# Patient Record
Sex: MALE | Race: WHITE
[De-identification: names, ages, dates, MRNs, and addresses within clinical notes are randomized per-mention and may not be internally consistent; named-entity substitution may affect disease eponyms.]

---

## 2019-11-03 ENCOUNTER — HOSPITAL ENCOUNTER (EMERGENCY)
Dept: HOSPITAL 62 - ER | Age: 39
Discharge: HOME | End: 2019-11-03
Payer: COMMERCIAL

## 2019-11-03 VITALS — SYSTOLIC BLOOD PRESSURE: 143 MMHG | DIASTOLIC BLOOD PRESSURE: 84 MMHG

## 2019-11-03 DIAGNOSIS — I10: ICD-10-CM

## 2019-11-03 DIAGNOSIS — Z88.0: ICD-10-CM

## 2019-11-03 DIAGNOSIS — Z88.6: ICD-10-CM

## 2019-11-03 DIAGNOSIS — K04.7: Primary | ICD-10-CM

## 2019-11-03 DIAGNOSIS — R22.0: ICD-10-CM

## 2019-11-03 LAB
ADD MANUAL DIFF: NO
ALBUMIN SERPL-MCNC: 3.8 G/DL (ref 3.5–5)
ALP SERPL-CCNC: 77 U/L (ref 38–126)
ANION GAP SERPL CALC-SCNC: 8 MMOL/L (ref 5–19)
AST SERPL-CCNC: 24 U/L (ref 17–59)
BASOPHILS # BLD AUTO: 0 10^3/UL (ref 0–0.2)
BASOPHILS NFR BLD AUTO: 0.2 % (ref 0–2)
BILIRUB DIRECT SERPL-MCNC: 0.1 MG/DL (ref 0–0.4)
BILIRUB SERPL-MCNC: 0.6 MG/DL (ref 0.2–1.3)
BUN SERPL-MCNC: 12 MG/DL (ref 7–20)
CALCIUM: 9 MG/DL (ref 8.4–10.2)
CHLORIDE SERPL-SCNC: 103 MMOL/L (ref 98–107)
CO2 SERPL-SCNC: 29 MMOL/L (ref 22–30)
EOSINOPHIL # BLD AUTO: 0.1 10^3/UL (ref 0–0.6)
EOSINOPHIL NFR BLD AUTO: 1.1 % (ref 0–6)
ERYTHROCYTE [DISTWIDTH] IN BLOOD BY AUTOMATED COUNT: 13.5 % (ref 11.5–14)
GLUCOSE SERPL-MCNC: 107 MG/DL (ref 75–110)
HCT VFR BLD CALC: 40.4 % (ref 37.9–51)
HGB BLD-MCNC: 14.2 G/DL (ref 13.5–17)
LYMPHOCYTES # BLD AUTO: 2 10^3/UL (ref 0.5–4.7)
LYMPHOCYTES NFR BLD AUTO: 19.4 % (ref 13–45)
MCH RBC QN AUTO: 31.4 PG (ref 27–33.4)
MCHC RBC AUTO-ENTMCNC: 35.2 G/DL (ref 32–36)
MCV RBC AUTO: 89 FL (ref 80–97)
MONOCYTES # BLD AUTO: 0.8 10^3/UL (ref 0.1–1.4)
MONOCYTES NFR BLD AUTO: 7.5 % (ref 3–13)
NEUTROPHILS # BLD AUTO: 7.5 10^3/UL (ref 1.7–8.2)
NEUTS SEG NFR BLD AUTO: 71.8 % (ref 42–78)
PLATELET # BLD: 207 10^3/UL (ref 150–450)
POTASSIUM SERPL-SCNC: 3.7 MMOL/L (ref 3.6–5)
PROT SERPL-MCNC: 6.7 G/DL (ref 6.3–8.2)
RBC # BLD AUTO: 4.52 10^6/UL (ref 4.35–5.55)
TOTAL CELLS COUNTED % (AUTO): 100 %
WBC # BLD AUTO: 10.5 10^3/UL (ref 4–10.5)

## 2019-11-03 PROCEDURE — 96375 TX/PRO/DX INJ NEW DRUG ADDON: CPT

## 2019-11-03 PROCEDURE — 87040 BLOOD CULTURE FOR BACTERIA: CPT

## 2019-11-03 PROCEDURE — 96365 THER/PROPH/DIAG IV INF INIT: CPT

## 2019-11-03 PROCEDURE — 80053 COMPREHEN METABOLIC PANEL: CPT

## 2019-11-03 PROCEDURE — 36415 COLL VENOUS BLD VENIPUNCTURE: CPT

## 2019-11-03 PROCEDURE — 85025 COMPLETE CBC W/AUTO DIFF WBC: CPT

## 2019-11-03 PROCEDURE — 99284 EMERGENCY DEPT VISIT MOD MDM: CPT

## 2019-11-03 PROCEDURE — 70487 CT MAXILLOFACIAL W/DYE: CPT

## 2019-11-03 NOTE — ER DOCUMENT REPORT
ED Oral Problem





- General


Chief Complaint: Facial Swelling


Stated Complaint: JAW SWELLING


Time Seen by Provider: 11/03/19 02:06


Primary Care Provider: 


[facility] [Provider Group] - 11/04/19


CINDY FLORENTINO FNP-C [Primary Care Provider] - Follow up as needed


Mode of Arrival: Ambulatory


Information source: Patient


Notes: 





Patient presents complaining of facial swelling started about 4 hours ago.  

Patient complains of dental pain as well.  Patient denies any fever.


TRAVEL OUTSIDE OF THE [place] IN LAST 30 DAYS: No





- HPI


Patient complains to provider of: Swelling of face


Onset: Just prior to arrival


Onset: Gradual


Quality of pain: Achy


Pain Level: 3


Context: Fractured tooth


Swollen jaw/face: Moderate


Associated symptoms: Dental decay, Facial pain.  denies: Cough, Drainage, Fever,

Short of breath


Similar symptoms previously: Yes


Recently seen / treated by doctor/dentist: No





- Related Data


Allergies/Adverse Reactions: 


                                        





amoxicillin trihydrate [From Augmentin] Allergy (Verified 10/13/12 00:20)


   


aspirin [Aspirin] Allergy (Verified 01/10/13 06:50)


   


ceftriaxone sodium [From Rocephin IM Convenience] Allergy (Verified 10/13/12 

00:20)


   


Penicillins Allergy (Verified 10/13/12 00:20)


   


Potassium Clavulanate * [From Augmentin] Allergy (Verified 10/13/12 00:20)


   








Home Medications: Ranitidine, Prilosec





Past Medical History





- General


Information source: Patient





- Social History


Smoking Status: Never Smoker


Frequency of alcohol use: Occasional


Drug Abuse: None


Occupation: Foodservice


Family History: Reviewed & Not Pertinent


Patient has suicidal ideation: No


Patient has homicidal ideation: No





- Past Medical History


Cardiac Medical History: Reports: Hx Hypertension


Pulmonary Medical History: Reports: Hx Asthma


Neurological Medical History: Reports: Hx Migraine


GI Medical History: Reports: Hx Gastroesophageal Reflux Disease


Psychiatric Medical History: Reports: Hx Attention Deficit Hyperactivity 

Disorder, Hx Depression


Surgical Hx: Negative





- Immunizations


Hx Diphtheria, Pertussis, Tetanus Vaccination: Yes





Review of Systems





- Review of Systems


Constitutional: No symptoms reported.  denies: Fever


EENT: Dental problem, Other - Dental caries, facial swelling


Cardiovascular: No symptoms reported.  denies: Chest pain


Respiratory: No symptoms reported.  denies: Cough, Short of breath


Gastrointestinal: No symptoms reported.  denies: Nausea, Vomiting


Genitourinary: No symptoms reported


Male Genitourinary: No symptoms reported


Musculoskeletal: No symptoms reported


Skin: No symptoms reported


Hematologic/Lymphatic: No symptoms reported


Neurological/Psychological: No symptoms reported





Physical Exam





- Vital signs


Vitals: 


                                        











Temp Pulse Resp BP Pulse Ox


 


 97.5 F   91   20   149/100 H  100 


 


 11/03/19 01:50 EST  11/03/19 01:50 EST  11/03/19 01:50 EST  11/03/19 01:50 EST 

11/03/19 01:50 EST














- General


General appearance: Appears well, Alert


In distress: None





- HEENT


Head: Normocephalic, Atraumatic


Eyes: Normal


Conjunctiva: Normal


Nasal: Normal


Mouth/Lips: Caries


Teeth diagram: 


                            __________________________














                            __________________________





 1 - dental decay





Pharynx: Normal


Neck: Normal, Supple.  No: Lymphadenopathy


Notes: 





Patient with significant left maxillary facial swelling that is distorting his 

nose and extends to the left infraorbital area





- Respiratory


Respiratory status: No respiratory distress


Chest status: Nontender


Breath sounds: Normal.  No: Rales, Rhonchi, Stridor, Wheezing


Chest palpation: Normal





- Cardiovascular


Rhythm: Regular


Heart sounds: S1 appreciated, S2 appreciated





- Back


Back: Normal





- Extremities


General upper extremity: Normal inspection, Normal strength


General lower extremity: Normal inspection, Normal strength





- Neurological


Neuro grossly intact: Yes


Cognition: Normal


Clotilde Coma Scale Eye Opening: Spontaneous


Clotilde Coma Scale Verbal: Oriented


[place] Coma Scale Motor: Obeys Commands


Clotilde Coma Scale Total: 15





- Psychological


Associated symptoms: Normal affect, Normal mood





- Skin


Skin Temperature: Warm


Skin Moisture: Dry


Skin Color: Normal





Course





- Re-evaluation


Re-evalutation: 





11/03/19 04:20


Reviewed patient's CT as well as diagnostic test results.  No concern for sepsis

at this time.  Consulted with Dr. Sanford regarding patient presentation and 

evaluation.  Recommends outpatient follow-up with dentist or oral surgeon for 

definitive treatment.  Patient without any fever, leukocytosis or findings wo

rrisome for sepsis.





- Vital Signs


Vital signs: 


                                        











Temp Pulse Resp BP Pulse Ox


 


 98.0 F   80   18   143/84 H  100 


 


 11/03/19 04:40  11/03/19 04:40  11/03/19 04:40  11/03/19 04:40  11/03/19 04:40














- Laboratory


Result Diagrams: 


                                 11/03/19 02:40





                                 11/03/19 02:40


Laboratory results interpreted by me: 





                               Labs- Entire Visit











  11/03/19 11/03/19





  02:40 02:40


 


WBC  10.5 


 


RBC  4.52 


 


Hgb  14.2 


 


Hct  40.4 


 


MCV  89 


 


MCH  31.4 


 


MCHC  35.2 


 


RDW  13.5 


 


Plt Count  207 


 


Lymph % (Auto)  19.4 


 


Mono % (Auto)  7.5 


 


Eos % (Auto)  1.1 


 


Baso % (Auto)  0.2 


 


Absolute Neuts (auto)  7.5 


 


Absolute Lymphs (auto)  2.0 


 


Absolute Monos (auto)  0.8 


 


Absolute Eos (auto)  0.1 


 


Absolute Basos (auto)  0.0 


 


Seg Neutrophils %  71.8 


 


Sodium   140.0


 


Potassium   3.7


 


Chloride   103


 


Carbon Dioxide   29


 


Anion Gap   8


 


BUN   12


 


Creatinine   0.85


 


Est GFR ( Amer)   > 60


 


Est GFR (MDRD) Non-Af   > 60


 


Glucose   107


 


Calcium   9.0


 


Total Bilirubin   0.6


 


Direct Bilirubin   0.1


 


Neonat Total Bilirubin   Not Reportable


 


Neonat Direct Bilirubin   Not Reportable


 


Neonat Indirect Bili   Not Reportable


 


AST   24


 


ALT   33


 


Alkaline Phosphatase   77


 


Total Protein   6.7


 


Albumin   3.8














- Diagnostic Test


Radiology reviewed: Reports reviewed





Discharge





- Discharge


Clinical Impression: 


 Dental abscess





Condition: Stable


Disposition: HOME, SELF-CARE


Instructions:  Clindamycin ([facility]), Dentist, Dental Infection or Abscess ([facility]), 

Oral Narcotic Medication ([facility])


Additional Instructions: 


Return immediately for any new or worsening symptoms





Followup with your primary care provider, call tomorrow to make a followup 

appointment





Sleep upright when possible to help decrease swelling. 





Follow-up with a dental care provider, call Monday for an appointment


Prescriptions: 


Acetaminophen with Codeine [Tylenol #3 Tablet] 1 each PO Q6HP PRN #15 tablet


 PRN Reason: 


Clindamycin HCl [Cleocin Hcl] 300 mg PO QID #28 capsule


Referrals: 


CINDY FLORENTINO FNP-C [Primary Care Provider] - Follow up as needed


[facility] [Provider Group] - 11/04/19

## 2019-11-03 NOTE — RADIOLOGY REPORT (SQ)
EXAM DESCRIPTION: 



CT MAXILLOFACIAL WITH IV CONTRAST



COMPLETED DATE/TME:  11/03/2019 02:18



CLINICAL HISTORY: 



39 years, Male, dental infection, L facial swelling



COMPARISON:

None.



TECHNIQUE:

335  Images stored on PACS.

 

All CT scanners at this facility use dose modulation, iterative

reconstruction, and/or weight based dosing when appropriate to

reduce radiation dose to as low as reasonably achievable (ALARA).





[facility]: Dose Right [facility]: CareDose   [facility]: Dose Right    [facility]:

Teradose 4D    [facility]: Smart Technologies



LIMITATIONS:

None.



FINDINGS:



The globes are intact. Limited evaluation of brain parenchyma is

unremarkable. The coastal thickening of the maxillary sinuses and

ethmoid air cells. Severe soft tissue swelling along the left

face and cheek. There is severe poor dentition. Multiple dental

caries, with a large dental caries and periapical abscess

associated with the left posterior most remaining maxillary

molar. Multiple absent teeth.





IMPRESSION:



Severely poor dentition. Multiple dental caries. Large left

periapical abscess with the left maxillary molar. Adjacent soft

tissue swelling and inflammation along the left face/cheek

 

TECHNICAL DOCUMENTATION:



Quality ID # 436: Final reports with documentation of one or more

dose reduction techniques (e.g., Automated exposure control,

adjustment of the mA and/or kV according to patient size, use of

iterative reconstruction technique)



copyright 2011 [employer]- All Rights Reserved